# Patient Record
Sex: MALE | Race: WHITE | NOT HISPANIC OR LATINO | Employment: UNEMPLOYED | URBAN - METROPOLITAN AREA
[De-identification: names, ages, dates, MRNs, and addresses within clinical notes are randomized per-mention and may not be internally consistent; named-entity substitution may affect disease eponyms.]

---

## 2023-08-02 ENCOUNTER — APPOINTMENT (EMERGENCY)
Dept: CT IMAGING | Facility: HOSPITAL | Age: 68
End: 2023-08-02
Payer: MEDICARE

## 2023-08-02 ENCOUNTER — HOSPITAL ENCOUNTER (EMERGENCY)
Facility: HOSPITAL | Age: 68
Discharge: HOME | End: 2023-08-02
Attending: EMERGENCY MEDICINE
Payer: MEDICARE

## 2023-08-02 VITALS
TEMPERATURE: 97.6 F | WEIGHT: 166.01 LBS | OXYGEN SATURATION: 96 % | HEART RATE: 65 BPM | DIASTOLIC BLOOD PRESSURE: 79 MMHG | RESPIRATION RATE: 16 BRPM | SYSTOLIC BLOOD PRESSURE: 139 MMHG

## 2023-08-02 DIAGNOSIS — R55 SYNCOPE: Primary | ICD-10-CM

## 2023-08-02 LAB
2HR DELTA HS TROPONIN: -1 NG/L
ALBUMIN SERPL BCP-MCNC: 4.3 G/DL (ref 3.5–5)
ALP SERPL-CCNC: 26 U/L (ref 34–104)
ALT SERPL W P-5'-P-CCNC: 17 U/L (ref 7–52)
ANION GAP SERPL CALCULATED.3IONS-SCNC: 7 MMOL/L
AST SERPL W P-5'-P-CCNC: 19 U/L (ref 13–39)
ATRIAL RATE: 60 BPM
ATRIAL RATE: 79 BPM
BASOPHILS # BLD AUTO: 0.03 THOUSANDS/ÂΜL (ref 0–0.1)
BASOPHILS NFR BLD AUTO: 1 % (ref 0–1)
BILIRUB SERPL-MCNC: 0.52 MG/DL (ref 0.2–1)
BUN SERPL-MCNC: 18 MG/DL (ref 5–25)
CALCIUM SERPL-MCNC: 10 MG/DL (ref 8.4–10.2)
CARDIAC TROPONIN I PNL SERPL HS: 6 NG/L
CARDIAC TROPONIN I PNL SERPL HS: 7 NG/L
CHLORIDE SERPL-SCNC: 103 MMOL/L (ref 96–108)
CO2 SERPL-SCNC: 30 MMOL/L (ref 21–32)
CREAT SERPL-MCNC: 1.02 MG/DL (ref 0.6–1.3)
EOSINOPHIL # BLD AUTO: 0.33 THOUSAND/ÂΜL (ref 0–0.61)
EOSINOPHIL NFR BLD AUTO: 6 % (ref 0–6)
ERYTHROCYTE [DISTWIDTH] IN BLOOD BY AUTOMATED COUNT: 12.3 % (ref 11.6–15.1)
GFR SERPL CREATININE-BSD FRML MDRD: 75 ML/MIN/1.73SQ M
GLUCOSE SERPL-MCNC: 126 MG/DL (ref 65–140)
HCT VFR BLD AUTO: 42.3 % (ref 36.5–49.3)
HGB BLD-MCNC: 14.9 G/DL (ref 12–17)
IMM GRANULOCYTES # BLD AUTO: 0.01 THOUSAND/UL (ref 0–0.2)
IMM GRANULOCYTES NFR BLD AUTO: 0 % (ref 0–2)
LYMPHOCYTES # BLD AUTO: 1.4 THOUSANDS/ÂΜL (ref 0.6–4.47)
LYMPHOCYTES NFR BLD AUTO: 24 % (ref 14–44)
MCH RBC QN AUTO: 32.9 PG (ref 26.8–34.3)
MCHC RBC AUTO-ENTMCNC: 35.2 G/DL (ref 31.4–37.4)
MCV RBC AUTO: 93 FL (ref 82–98)
MONOCYTES # BLD AUTO: 0.61 THOUSAND/ÂΜL (ref 0.17–1.22)
MONOCYTES NFR BLD AUTO: 11 % (ref 4–12)
NEUTROPHILS # BLD AUTO: 3.37 THOUSANDS/ÂΜL (ref 1.85–7.62)
NEUTS SEG NFR BLD AUTO: 58 % (ref 43–75)
NRBC BLD AUTO-RTO: 0 /100 WBCS
P AXIS: 76 DEGREES
P AXIS: 76 DEGREES
PLATELET # BLD AUTO: 160 THOUSANDS/UL (ref 149–390)
PMV BLD AUTO: 9.7 FL (ref 8.9–12.7)
POTASSIUM SERPL-SCNC: 4 MMOL/L (ref 3.5–5.3)
PR INTERVAL: 178 MS
PR INTERVAL: 184 MS
PROT SERPL-MCNC: 6.4 G/DL (ref 6.4–8.4)
QRS AXIS: 80 DEGREES
QRS AXIS: 82 DEGREES
QRSD INTERVAL: 82 MS
QRSD INTERVAL: 86 MS
QT INTERVAL: 406 MS
QT INTERVAL: 416 MS
QTC INTERVAL: 416 MS
QTC INTERVAL: 465 MS
RBC # BLD AUTO: 4.53 MILLION/UL (ref 3.88–5.62)
SODIUM SERPL-SCNC: 140 MMOL/L (ref 135–147)
T WAVE AXIS: 72 DEGREES
T WAVE AXIS: 73 DEGREES
VENTRICULAR RATE: 60 BPM
VENTRICULAR RATE: 79 BPM
WBC # BLD AUTO: 5.75 THOUSAND/UL (ref 4.31–10.16)

## 2023-08-02 PROCEDURE — 99285 EMERGENCY DEPT VISIT HI MDM: CPT | Performed by: EMERGENCY MEDICINE

## 2023-08-02 PROCEDURE — 93010 ELECTROCARDIOGRAM REPORT: CPT | Performed by: STUDENT IN AN ORGANIZED HEALTH CARE EDUCATION/TRAINING PROGRAM

## 2023-08-02 PROCEDURE — 84484 ASSAY OF TROPONIN QUANT: CPT

## 2023-08-02 PROCEDURE — G1004 CDSM NDSC: HCPCS

## 2023-08-02 PROCEDURE — 80053 COMPREHEN METABOLIC PANEL: CPT

## 2023-08-02 PROCEDURE — 85025 COMPLETE CBC W/AUTO DIFF WBC: CPT

## 2023-08-02 PROCEDURE — 36415 COLL VENOUS BLD VENIPUNCTURE: CPT

## 2023-08-02 PROCEDURE — 70450 CT HEAD/BRAIN W/O DYE: CPT

## 2023-08-02 PROCEDURE — 93005 ELECTROCARDIOGRAM TRACING: CPT

## 2023-08-02 NOTE — ED PROVIDER NOTES
History  Chief Complaint   Patient presents with   • Fall     Pt reports waking up out of his sleep from a shooting pain in his left thigh. Pt went to the bathroom and "passed out in the bathroom" pt unsure of head strike, (+)loc. Pt states taking "2 baby aspirin before coming here."    • Syncope     72-year-old male with no pertinent PMH presenting for evaluation of syncope. Patient states that he woke up in the middle of the night with a sharp pain in his left thigh, got up from bed to walk to the bathroom, began feeling lightheaded when he entered the bathroom and subsequently passed out. He does not know how long he lost consciousness for, but does not think it was very long. Does not think he hit his head but is not sure. After waking up, patient continued to feel lightheaded. He has never had a syncopal episode before. Denies tongue bite or loss of continence. Denies SOB, chest pain, nausea/vomiting, diarrhea. No history of DVT, PE, MI, CVA, cancer, diabetes, HTN, HLD. No recent illness, travel, period of immobilization, surgery. None       History reviewed. No pertinent past medical history. History reviewed. No pertinent surgical history. History reviewed. No pertinent family history. I have reviewed and agree with the history as documented. E-Cigarette/Vaping     E-Cigarette/Vaping Substances     Social History     Tobacco Use   • Smoking status: Some Days     Types: Cigars   • Smokeless tobacco: Never   Substance Use Topics   • Alcohol use: Yes     Comment: socially   • Drug use: Never       Review of Systems   Constitutional: Negative for chills and fever. HENT: Negative for ear pain and sore throat. Eyes: Negative for pain and visual disturbance. Respiratory: Negative for cough and shortness of breath. Cardiovascular: Negative for chest pain and palpitations. Gastrointestinal: Negative for abdominal pain and vomiting.    Genitourinary: Negative for dysuria and hematuria. Musculoskeletal: Positive for myalgias (shooting pain in L. thigh). Negative for arthralgias and back pain. Skin: Negative for color change and rash. Neurological: Positive for syncope. Negative for seizures. All other systems reviewed and are negative. Physical Exam  Physical Exam  Vitals and nursing note reviewed. Constitutional:       General: He is not in acute distress. Appearance: He is well-developed. HENT:      Head: Normocephalic and atraumatic. Eyes:      Conjunctiva/sclera: Conjunctivae normal.   Cardiovascular:      Rate and Rhythm: Normal rate and regular rhythm. Heart sounds: No murmur heard. Pulmonary:      Effort: Pulmonary effort is normal. No respiratory distress. Breath sounds: Normal breath sounds. Abdominal:      Palpations: Abdomen is soft. Tenderness: There is no abdominal tenderness. Musculoskeletal:         General: No swelling. Cervical back: Neck supple. Skin:     General: Skin is warm and dry. Capillary Refill: Capillary refill takes less than 2 seconds. Neurological:      Mental Status: He is alert.    Psychiatric:         Mood and Affect: Mood normal.         Vital Signs  ED Triage Vitals   Temperature Pulse Respirations Blood Pressure SpO2   08/02/23 0349 08/02/23 0350 08/02/23 0350 08/02/23 0350 08/02/23 0350   97.6 °F (36.4 °C) 63 18 142/75 98 %      Temp Source Heart Rate Source Patient Position - Orthostatic VS BP Location FiO2 (%)   08/02/23 0349 08/02/23 0350 08/02/23 0350 08/02/23 0350 --   Oral Monitor Sitting Right arm       Pain Score       --                  Vitals:    08/02/23 0500 08/02/23 0530 08/02/23 0600 08/02/23 0630   BP: 132/76 153/87 142/81 139/79   Pulse: 65 64 65 65   Patient Position - Orthostatic VS: Sitting Sitting Sitting Sitting         Visual Acuity  Visual Acuity    Flowsheet Row Most Recent Value   L Pupil Size (mm) 3   R Pupil Size (mm) 3          ED Medications  Medications - No data to display    Diagnostic Studies  Results Reviewed     Procedure Component Value Units Date/Time    HS Troponin I 2hr [[id number]] Collected: 08/02/23 9606    Lab Status: No result Specimen: Blood from Line, Venous     HS Troponin 0hr (reflex protocol) [[id number]]  (Normal) Collected: 08/02/23 0435    Lab Status: Final result Specimen: Blood from Arm, Left Updated: 08/02/23 0506     hs TnI 0hr 7 ng/L     Comprehensive metabolic panel [[id number]]  (Abnormal) Collected: 08/02/23 0435    Lab Status: Final result Specimen: Blood from Arm, Left Updated: 08/02/23 0500     Sodium 140 mmol/L      Potassium 4.0 mmol/L      Chloride 103 mmol/L      CO2 30 mmol/L      ANION GAP 7 mmol/L      BUN 18 mg/dL      Creatinine 1.02 mg/dL      Glucose 126 mg/dL      Calcium 10.0 mg/dL      AST 19 U/L      ALT 17 U/L      Alkaline Phosphatase 26 U/L      Total Protein 6.4 g/dL      Albumin 4.3 g/dL      Total Bilirubin 0.52 mg/dL      eGFR 75 ml/min/1.73sq m     Narrative:      [place] guidelines for Chronic Kidney Disease (CKD):   •  Stage 1 with normal or high GFR (GFR > 90 mL/min/1.73 square meters)  •  Stage 2 Mild CKD (GFR = 60-89 mL/min/1.73 square meters)  •  Stage 3A Moderate CKD (GFR = 45-59 mL/min/1.73 square meters)  •  Stage 3B Moderate CKD (GFR = 30-44 mL/min/1.73 square meters)  •  Stage 4 Severe CKD (GFR = 15-29 mL/min/1.73 square meters)  •  Stage 5 End Stage CKD (GFR <15 mL/min/1.73 square meters)  Note: GFR calculation is accurate only with a steady state creatinine    CBC and differential [[id number]] Collected: 08/02/23 0435    Lab Status: Final result Specimen: Blood from Arm, Left Updated: 08/02/23 0441     WBC 5.75 Thousand/uL      RBC 4.53 Million/uL      Hemoglobin 14.9 g/dL      Hematocrit 42.3 %      MCV 93 fL      MCH 32.9 pg      MCHC 35.2 g/dL      RDW 12.3 %      MPV 9.7 fL      Platelets 753 Thousands/uL      nRBC 0 /100 WBCs      Neutrophils Relative 58 %      Immat GRANS % 0 % Lymphocytes Relative 24 %      Monocytes Relative 11 %      Eosinophils Relative 6 %      Basophils Relative 1 %      Neutrophils Absolute 3.37 Thousands/µL      Immature Grans Absolute 0.01 Thousand/uL      Lymphocytes Absolute 1.40 Thousands/µL      Monocytes Absolute 0.61 Thousand/µL      Eosinophils Absolute 0.33 Thousand/µL      Basophils Absolute 0.03 Thousands/µL                  CT head without contrast   Final Result by Suhas Gonzalez DO (08/02 0459)      No acute intracranial abnormality. Chronic microangiopathic changes. Workstation performed: [id number]                    Procedures  Procedures         ED Course  ED Course as of 08/02/23 0634   Wed Aug 02, 2023   0624 If delta trop is unremarkable, d/c home with recommendation to follow up with PCP and/or cardiology. HEART Risk Score    Flowsheet Row Most Recent Value   Heart Score Risk Calculator    History 0 Filed at: 08/02/2023 0521   ECG 0 Filed at: 08/02/2023 1440   Age 2 Filed at: 08/02/2023 4502   Risk Factors 0 Filed at: 08/02/2023 0521   Troponin 0 Filed at: 08/02/2023 4268   HEART Score 2 Filed at: 08/02/2023 7932                        SBIRT 22yo+    Flowsheet Row Most Recent Value   Initial Alcohol Screen: US AUDIT-C     1. How often do you have a drink containing alcohol? 0 Filed at: 08/02/2023 0413   2. How many drinks containing alcohol do you have on a typical day you are drinking? 0 Filed at: 08/02/2023 0413   3a. Male UNDER 65: How often do you have five or more drinks on one occasion? 0 Filed at: 08/02/2023 0413   3b. FEMALE Any Age, or MALE 65+: How often do you have 4 or more drinks on one occassion? 0 Filed at: 08/02/2023 0413   Audit-C Score 0 Filed at: 08/02/2023 7510   IGOR: How many times in the past year have you. .. Used an illegal drug or used a prescription medication for non-medical reasons?  Never Filed at: 08/02/2023 0413                    Medical Decision Making  69-year-old male presents for evaluation of syncopal episode. No prior episodes. Unknown downtime, unknown if head strike. No thinners. Exam: Patient appears somewhat anxious but otherwise in no distress, AOx3, vitals WNL; POC EKG shows normal sinus rhythm; lungs CTA B, normal respiratory effort, heart RRR, no murmurs appreciated, no swelling/tenderness/skin changes of lower extremities. Work-up: CBC, CMP, troponin, CT head without contrast.    CT showed no acute abnormalities. Initial troponin is 7, will obtain delta as < 3 hours since syncopal incident and patient has not had any episodes like this in the past. Will recommend follow up with PCP and/or cardiology for reevaluation. Return to ER for new or worsening symptoms. Patient hemodynamically stable at dispo. Amount and/or Complexity of Data Reviewed  Labs: ordered. Radiology: ordered. Disposition  Final diagnoses:   Syncope     Time reflects when diagnosis was documented in both MDM as applicable and the Disposition within this note     Time User Action Codes Description Comment    8/2/2023  6:14 AM Ana Vanessa, [address] [place] [address] [[id number]] Syncope       ED Disposition     ED Disposition   Discharge    Condition   Stable    Date/Time   Wed Aug 2, 2023  6:13 AM    Comment   Winferd Lipoma discharge to home/self care.                Follow-up Information     Follow up With Specialties Details Why Contact Info Additional [facility] Emergency Medicine  If symptoms worsen [address] [address]  [address] [facility], [address] [facility], [place], [place], [address] [address] Cardiology Schedule an appointment as soon as possible for a visit   [address] [address]  [place] [id number] [address] [place] [address], [address] [phone]          Patient's Medications    No medications on file           [address] Review     None          ED Provider  Electronically Signed by           Sally Jamison PA-C  08/02/23 8841

## 2023-08-02 NOTE — DISCHARGE INSTRUCTIONS
We did not see any objective cause of your syncopal episode tonight. The CT scan of your head was also unremarkable. Please follow-up with your PCP for reevaluation of your symptoms. Return to the ER for new or worsening symptoms.
